# Patient Record
Sex: FEMALE | Race: WHITE | NOT HISPANIC OR LATINO | Employment: FULL TIME | URBAN - METROPOLITAN AREA
[De-identification: names, ages, dates, MRNs, and addresses within clinical notes are randomized per-mention and may not be internally consistent; named-entity substitution may affect disease eponyms.]

---

## 2024-01-01 ENCOUNTER — HOSPITAL ENCOUNTER (EMERGENCY)
Facility: MEDICAL CENTER | Age: 54
End: 2024-01-01
Attending: EMERGENCY MEDICINE
Payer: COMMERCIAL

## 2024-01-01 VITALS
HEART RATE: 85 BPM | TEMPERATURE: 97.7 F | RESPIRATION RATE: 15 BRPM | WEIGHT: 170 LBS | OXYGEN SATURATION: 95 % | SYSTOLIC BLOOD PRESSURE: 95 MMHG | BODY MASS INDEX: 28.32 KG/M2 | HEIGHT: 65 IN | DIASTOLIC BLOOD PRESSURE: 60 MMHG

## 2024-01-01 DIAGNOSIS — F10.929 ALCOHOLIC INTOXICATION WITH COMPLICATION (HCC): ICD-10-CM

## 2024-01-01 PROCEDURE — 99284 EMERGENCY DEPT VISIT MOD MDM: CPT

## 2024-01-01 NOTE — ED TRIAGE NOTES
"Chief Complaint   Patient presents with    Alcohol Intoxication     BIBA, bystander called 911. Per EMS report, pt was found laying down on stairwell in [facility]. Per pt she had 2 glasses of wine and she has no recollection of what happened to her.      /56   Pulse 81   Temp 36.4 °C (97.6 °F) (Temporal)   Resp 20   Ht 1.651 m (5' 5\")   Wt 77.1 kg (170 lb)   SpO2 96%   BMI 28.29 kg/m²     "

## 2024-01-01 NOTE — ED PROVIDER NOTES
"ED Provider Note    CHIEF COMPLAINT  Chief Complaint   Patient presents with    Alcohol Intoxication     BIBA, bystander called 911. Per EMS report, pt was found laying down on stairwell in [facility]. Per pt she had 2 glasses of wine and she has no recollection of what happened to her.          HPI/ROS      Layla Pizarro is a 53 y.o. female who presents with etoh intoxication.  Patient reports that she was celebrating the new year, [place] of the [place], and drinking.  Patient states that she believes she only drank around 2 glasses of wine.  Patient reports blacked out at some point, she denies any recent falls.  She remembers getting out of the cab and going to the casino and sitting down in the stairwell.  At around that time a bystander called 911 the patient was brought to our facility for further care.  Patient denies any any other major medical problems    PAST MEDICAL HISTORY       SURGICAL HISTORY  patient denies any surgical history    FAMILY HISTORY  History reviewed. No pertinent family history.    SOCIAL HISTORY  Social History     Tobacco Use    Smoking status: Never    Smokeless tobacco: Never   Vaping Use    Vaping Use: Never used   Substance and Sexual Activity    Alcohol use: Yes     Comment: Occasional    Drug use: Never    Sexual activity: Not on file       CURRENT MEDICATIONS  Home Medications       Reviewed by Sarah Yan R.N. (Registered Nurse) on 01/01/24 at 0527  Med List Status: Not Addressed     Medication Last Dose Status        Patient Salvador Taking any Medications                           ALLERGIES  Not on File    PHYSICAL EXAM  VITAL SIGNS: /56   Pulse 81   Temp 36.4 °C (97.6 °F) (Temporal)   Resp 20   Ht 1.651 m (5' 5\")   Wt 77.1 kg (170 lb)   SpO2 96%   BMI 28.29 kg/m²    Physical Exam  Constitutional:       Appearance: She is well-developed.      Comments: Smells of etoh   HENT:      Head: Normocephalic and atraumatic.      Comments: No cephalhematoma, no " scalp abrasions, no facial abrasions or evidence of trauma otherwise  Eyes:      Pupils: Pupils are equal, round, and reactive to light.   Cardiovascular:      Rate and Rhythm: Normal rate and regular rhythm.   Pulmonary:      Effort: Pulmonary effort is normal. No accessory muscle usage or respiratory distress.      Breath sounds: Normal breath sounds.   Abdominal:      General: Bowel sounds are normal.      Palpations: Abdomen is soft. There is no mass.      Tenderness: There is no abdominal tenderness.   Musculoskeletal:         General: Normal range of motion.   Skin:     General: Skin is warm.      Capillary Refill: Capillary refill takes less than 2 seconds.   Neurological:      General: No focal deficit present.      Mental Status: She is alert and oriented to person, place, and time.      Comments: Moving all extremities, strength is 5-5 in bilateral upper and lower extremities.  Mild slurred speech   Psychiatric:         Mood and Affect: Mood is not anxious.      Comments: Intoxicated           DIAGNOSTIC STUDIES / PROCEDURES      COURSE & MEDICAL DECISION MAKING      INITIAL ASSESSMENT, COURSE AND PLAN  Care Narrative: Patient here, very intoxicated.  I performed a breathalyzer on her, it returned at 180.  I believe this is likely secondary to over ingestion.  Patient without any evidence of trauma on exam.  Patient exam is very reassuring otherwise.  Will monitor here in the emergency department and ensure no significant changes or ongoing slurred speech when patient is clinically sober  Patient symptoms continue to improve throughout her stay in the emergency department.  Slurred speech resolved.  Patient is homeless, and is living out of her truck, she [place] [facility], I have asked nursing to coordinate with social work to ensure patient is provided transport back to this area so she can get her car.        DISPOSITION AND DISCUSSIONS    Escalation of care considered, and ultimately not  performed: Patient with obvious alcohol intoxication, admits to drinking, has elevated alcohol level and unremarkable neurologic exam outside of some mild slurred speech which resolved with observation.  Therefore my suspicion organic etiology outside of alcohol intoxication was very low and diagnostic imaging and labs were deferred    Barriers to care at this time, including but not limited to: Patient does not have established PCP.       FINAL DIAGNOSIS  1. Alcoholic intoxication with complication (HCC)